# Patient Record
Sex: MALE | Race: OTHER | NOT HISPANIC OR LATINO | URBAN - METROPOLITAN AREA
[De-identification: names, ages, dates, MRNs, and addresses within clinical notes are randomized per-mention and may not be internally consistent; named-entity substitution may affect disease eponyms.]

---

## 2019-07-20 ENCOUNTER — INPATIENT (INPATIENT)
Facility: HOSPITAL | Age: 54
LOS: 7 days | Discharge: HOME | DRG: 919 | End: 2019-07-28
Attending: SURGERY | Admitting: SURGERY
Payer: COMMERCIAL

## 2019-07-20 VITALS
HEART RATE: 91 BPM | HEIGHT: 65 IN | RESPIRATION RATE: 18 BRPM | OXYGEN SATURATION: 100 % | WEIGHT: 130.95 LBS | SYSTOLIC BLOOD PRESSURE: 138 MMHG | DIASTOLIC BLOOD PRESSURE: 93 MMHG | TEMPERATURE: 99 F

## 2019-07-20 DIAGNOSIS — K63.1 PERFORATION OF INTESTINE (NONTRAUMATIC): ICD-10-CM

## 2019-07-20 LAB
ALBUMIN SERPL ELPH-MCNC: 4.1 G/DL — SIGNIFICANT CHANGE UP (ref 3.5–5)
ALP SERPL-CCNC: 80 U/L — SIGNIFICANT CHANGE UP (ref 40–120)
ALT FLD-CCNC: 29 U/L DA — SIGNIFICANT CHANGE UP (ref 10–60)
ANION GAP SERPL CALC-SCNC: 8 MMOL/L — SIGNIFICANT CHANGE UP (ref 5–17)
AST SERPL-CCNC: 18 U/L — SIGNIFICANT CHANGE UP (ref 10–40)
BASOPHILS # BLD AUTO: 0 K/UL — SIGNIFICANT CHANGE UP (ref 0–0.2)
BASOPHILS NFR BLD AUTO: 0 % — SIGNIFICANT CHANGE UP (ref 0–2)
BILIRUB SERPL-MCNC: 4.5 MG/DL — HIGH (ref 0.2–1.2)
BUN SERPL-MCNC: 9 MG/DL — SIGNIFICANT CHANGE UP (ref 7–18)
CALCIUM SERPL-MCNC: 9.5 MG/DL — SIGNIFICANT CHANGE UP (ref 8.4–10.5)
CHLORIDE SERPL-SCNC: 103 MMOL/L — SIGNIFICANT CHANGE UP (ref 96–108)
CO2 SERPL-SCNC: 26 MMOL/L — SIGNIFICANT CHANGE UP (ref 22–31)
CREAT SERPL-MCNC: 1 MG/DL — SIGNIFICANT CHANGE UP (ref 0.5–1.3)
EOSINOPHIL # BLD AUTO: 0 K/UL — SIGNIFICANT CHANGE UP (ref 0–0.5)
EOSINOPHIL NFR BLD AUTO: 0 % — SIGNIFICANT CHANGE UP (ref 0–6)
GLUCOSE SERPL-MCNC: 120 MG/DL — HIGH (ref 70–99)
HCT VFR BLD CALC: 43 % — SIGNIFICANT CHANGE UP (ref 39–50)
HGB BLD-MCNC: 15 G/DL — SIGNIFICANT CHANGE UP (ref 13–17)
INR BLD: 0.98 RATIO — SIGNIFICANT CHANGE UP (ref 0.88–1.16)
LACTATE SERPL-SCNC: 2.7 MMOL/L — HIGH (ref 0.7–2)
LYMPHOCYTES # BLD AUTO: 1.14 K/UL — SIGNIFICANT CHANGE UP (ref 1–3.3)
LYMPHOCYTES # BLD AUTO: 6 % — LOW (ref 13–44)
MCHC RBC-ENTMCNC: 30.7 PG — SIGNIFICANT CHANGE UP (ref 27–34)
MCHC RBC-ENTMCNC: 34.9 GM/DL — SIGNIFICANT CHANGE UP (ref 32–36)
MCV RBC AUTO: 87.9 FL — SIGNIFICANT CHANGE UP (ref 80–100)
MONOCYTES # BLD AUTO: 1.33 K/UL — HIGH (ref 0–0.9)
MONOCYTES NFR BLD AUTO: 7 % — SIGNIFICANT CHANGE UP (ref 2–14)
NEUTROPHILS # BLD AUTO: 16.56 K/UL — HIGH (ref 1.8–7.4)
NEUTROPHILS NFR BLD AUTO: 82 % — HIGH (ref 43–77)
PLATELET # BLD AUTO: 214 K/UL — SIGNIFICANT CHANGE UP (ref 150–400)
POTASSIUM SERPL-MCNC: 3.9 MMOL/L — SIGNIFICANT CHANGE UP (ref 3.5–5.3)
POTASSIUM SERPL-SCNC: 3.9 MMOL/L — SIGNIFICANT CHANGE UP (ref 3.5–5.3)
PROT SERPL-MCNC: 8 G/DL — SIGNIFICANT CHANGE UP (ref 6–8.3)
PROTHROM AB SERPL-ACNC: 10.9 SEC — SIGNIFICANT CHANGE UP (ref 10–12.9)
RBC # BLD: 4.89 M/UL — SIGNIFICANT CHANGE UP (ref 4.2–5.8)
RBC # FLD: 13.4 % — SIGNIFICANT CHANGE UP (ref 10.3–14.5)
SODIUM SERPL-SCNC: 137 MMOL/L — SIGNIFICANT CHANGE UP (ref 135–145)
WBC # BLD: 19.03 K/UL — HIGH (ref 3.8–10.5)
WBC # FLD AUTO: 19.03 K/UL — HIGH (ref 3.8–10.5)

## 2019-07-20 PROCEDURE — 99285 EMERGENCY DEPT VISIT HI MDM: CPT

## 2019-07-20 PROCEDURE — 74177 CT ABD & PELVIS W/CONTRAST: CPT | Mod: 26

## 2019-07-20 RX ORDER — MORPHINE SULFATE 50 MG/1
2 CAPSULE, EXTENDED RELEASE ORAL EVERY 4 HOURS
Refills: 0 | Status: DISCONTINUED | OUTPATIENT
Start: 2019-07-20 | End: 2019-07-20

## 2019-07-20 RX ORDER — ACETAMINOPHEN 500 MG
1000 TABLET ORAL ONCE
Refills: 0 | Status: COMPLETED | OUTPATIENT
Start: 2019-07-20 | End: 2019-07-21

## 2019-07-20 RX ORDER — PIPERACILLIN AND TAZOBACTAM 4; .5 G/20ML; G/20ML
3.38 INJECTION, POWDER, LYOPHILIZED, FOR SOLUTION INTRAVENOUS EVERY 8 HOURS
Refills: 0 | Status: COMPLETED | OUTPATIENT
Start: 2019-07-20 | End: 2019-07-23

## 2019-07-20 RX ORDER — KETOROLAC TROMETHAMINE 30 MG/ML
30 SYRINGE (ML) INJECTION EVERY 6 HOURS
Refills: 0 | Status: DISCONTINUED | OUTPATIENT
Start: 2019-07-20 | End: 2019-07-20

## 2019-07-20 RX ORDER — PIPERACILLIN AND TAZOBACTAM 4; .5 G/20ML; G/20ML
3.38 INJECTION, POWDER, LYOPHILIZED, FOR SOLUTION INTRAVENOUS ONCE
Refills: 0 | Status: COMPLETED | OUTPATIENT
Start: 2019-07-20 | End: 2019-07-20

## 2019-07-20 RX ORDER — SODIUM CHLORIDE 9 MG/ML
1000 INJECTION INTRAMUSCULAR; INTRAVENOUS; SUBCUTANEOUS ONCE
Refills: 0 | Status: COMPLETED | OUTPATIENT
Start: 2019-07-20 | End: 2019-07-20

## 2019-07-20 RX ORDER — DEXTROSE MONOHYDRATE, SODIUM CHLORIDE, AND POTASSIUM CHLORIDE 50; .745; 4.5 G/1000ML; G/1000ML; G/1000ML
1000 INJECTION, SOLUTION INTRAVENOUS
Refills: 0 | Status: DISCONTINUED | OUTPATIENT
Start: 2019-07-20 | End: 2019-07-28

## 2019-07-20 RX ORDER — HEPARIN SODIUM 5000 [USP'U]/ML
5000 INJECTION INTRAVENOUS; SUBCUTANEOUS EVERY 8 HOURS
Refills: 0 | Status: DISCONTINUED | OUTPATIENT
Start: 2019-07-20 | End: 2019-07-28

## 2019-07-20 RX ORDER — SODIUM CHLORIDE 9 MG/ML
1000 INJECTION, SOLUTION INTRAVENOUS ONCE
Refills: 0 | Status: COMPLETED | OUTPATIENT
Start: 2019-07-20 | End: 2019-07-20

## 2019-07-20 RX ORDER — ONDANSETRON 8 MG/1
4 TABLET, FILM COATED ORAL EVERY 6 HOURS
Refills: 0 | Status: DISCONTINUED | OUTPATIENT
Start: 2019-07-20 | End: 2019-07-28

## 2019-07-20 RX ADMIN — PIPERACILLIN AND TAZOBACTAM 200 GRAM(S): 4; .5 INJECTION, POWDER, LYOPHILIZED, FOR SOLUTION INTRAVENOUS at 23:53

## 2019-07-20 RX ADMIN — SODIUM CHLORIDE 1000 MILLILITER(S): 9 INJECTION, SOLUTION INTRAVENOUS at 23:54

## 2019-07-20 RX ADMIN — SODIUM CHLORIDE 1000 MILLILITER(S): 9 INJECTION INTRAMUSCULAR; INTRAVENOUS; SUBCUTANEOUS at 21:30

## 2019-07-20 NOTE — ED PROVIDER NOTE - GASTROINTESTINAL, MLM
Abdomen soft, mid abd-tender, no guarding. mild distention- not peritoneal.  rectal no hemorrhoids, no palpable perforation

## 2019-07-20 NOTE — ED PROVIDER NOTE - OBJECTIVE STATEMENT
53 yr old male with no hx presents to ed c/o mid abd pain and having bloody dark stool post colonoscopy this am.  per Dr Grande had hemorrhoids and diverticulosis and was attempting to evaluate the rectal segment and was difficult.  post scope had bleeding but resolved then went home but then while home having abd pain and rectal pain. no ac use, no n/v, no fever.

## 2019-07-20 NOTE — H&P ADULT - HISTORY OF PRESENT ILLNESS
54 y/o male denies sig PMH/PSH  went for screening colonoscopy earlier this am by Dr Grande  after colonoscopy pt developed generalized abd pain  no f/c/n/v  had dark colored bm today    < from: CT Abdomen and Pelvis w/ IV Cont (07.20.19 @ 22:45) >  FINDINGS:    LUNG BASES:  There are no pleural effusions.  PERITONEUM: No free intraperitoneal air or focal collection.  LIVER: There is a 2.3 cm indeterminate hypodensity in the left lobe which   may be further characterized with MRI, likely cyst or hemangioma.  SPLEEN: Normal.  GALLBLADDER: Unremarkable.  BILIARY TREE: Unremarkable.  PANCREAS: Normal.  ADRENAL GLANDS: Normal.  KIDNEYS: Normal.  BOWEL: Although the stomach appears under distended, there appears to be   wall thickening of the distal stomach suggesting gastritis. There is no   small bowel obstruction. The appendix is unremarkable. The colon is under   distended. There is sigmoid diverticulosis. There is thickening at the   rectosigmoid junction with pericolonic free air and mesenteric venous   gas.     URINARY BLADDER: Decompressed.  PELVIC ORGANS: The prostate is not enlarged. Nonspecific calcifications.    There is no significant adenopathy.  VASCULATURE: Unremarkable.  RETROPERITONEUM: There is moderate amount of retroperitoneal free air,   predominantly around the pelvic sidewall, with extension around the   abdominal aorta, lesser sac, around the IVC, and right perinephric space.  BONES: Unremarkable.  ABDOMINAL WALL: Unremarkable.      IMPRESSION:    Moderate amount of retroperitoneal free air, most likely related to   colonic perforation at the rectosigmoid junction, where there is focal   bowel wall thickening and venous air. No focal collection or bowel   obstruction.    < end of copied text >

## 2019-07-20 NOTE — ED ADULT TRIAGE NOTE - CHIEF COMPLAINT QUOTE
c/o pain to upper and lower abdomen, also rectal pain , 2 hours ago passed black color blood while doing bmm one episode , s/p colonoscopy this morning as per patient

## 2019-07-20 NOTE — ED PROVIDER NOTE - PROGRESS NOTE DETAILS
Edwards: ct shows free air at retroperitoneal.  surgery called.  elevated lactate and wbc.  rpt abd exam, not peritoneal.    admit to surgery for perforated sigmoid. stable

## 2019-07-20 NOTE — H&P ADULT - ASSESSMENT
52 y/o male with abd pain s/p colonoscopy today  CT findings as per HPI  likely rectal perf with retroperitoneal air, no free intraperitoneal air/collection

## 2019-07-20 NOTE — H&P ADULT - PROBLEM SELECTOR PLAN 1
npo, hydrate, iv abx, serial abd exams, labs; discussed w/ pt that surgical intervention will involve colostomy; will treat w/ abx tonight and monitor closely; poss pre-op mode for exlap if no improvement

## 2019-07-20 NOTE — ED PROVIDER NOTE - CLINICAL SUMMARY MEDICAL DECISION MAKING FREE TEXT BOX
53 yr old male with no hx presents to ed c/o mid abd pain and having bloody dark stool post colonoscopy this am.  per Dr Grande had hemorrhoids and diverticulosis and was attempting to evaluate the rectal segment and was difficult.  post scope had bleeding but resolved then went home but then while home having abd pain and rectal pain. no ac use, no n/v, no fever.    pt with abd pain and rectal pain post colonoscopy r/o perforation vs insufflation gas pain - labs, ct, pain meds, re-assess  Dr Grande [phone] wants colorectal surgeon Yaquelin

## 2019-07-20 NOTE — H&P ADULT - NSHPPHYSICALEXAM_GEN_ALL_CORE
Pt awake, alert  NAD  S1S2  abd softly distended lower abd, mild generalized tenderness, no guarding or peritoneal signs  rect def

## 2019-07-21 DIAGNOSIS — K63.1 PERFORATION OF INTESTINE (NONTRAUMATIC): ICD-10-CM

## 2019-07-21 LAB
ANION GAP SERPL CALC-SCNC: 7 MMOL/L — SIGNIFICANT CHANGE UP (ref 5–17)
BUN SERPL-MCNC: 9 MG/DL — SIGNIFICANT CHANGE UP (ref 7–18)
CALCIUM SERPL-MCNC: 8.8 MG/DL — SIGNIFICANT CHANGE UP (ref 8.4–10.5)
CHLORIDE SERPL-SCNC: 103 MMOL/L — SIGNIFICANT CHANGE UP (ref 96–108)
CO2 SERPL-SCNC: 26 MMOL/L — SIGNIFICANT CHANGE UP (ref 22–31)
CREAT SERPL-MCNC: 1.08 MG/DL — SIGNIFICANT CHANGE UP (ref 0.5–1.3)
GLUCOSE SERPL-MCNC: 144 MG/DL — HIGH (ref 70–99)
HCT VFR BLD CALC: 40.7 % — SIGNIFICANT CHANGE UP (ref 39–50)
HGB BLD-MCNC: 14 G/DL — SIGNIFICANT CHANGE UP (ref 13–17)
MCHC RBC-ENTMCNC: 30.2 PG — SIGNIFICANT CHANGE UP (ref 27–34)
MCHC RBC-ENTMCNC: 34.4 GM/DL — SIGNIFICANT CHANGE UP (ref 32–36)
MCV RBC AUTO: 87.7 FL — SIGNIFICANT CHANGE UP (ref 80–100)
NRBC # BLD: 0 /100 WBCS — SIGNIFICANT CHANGE UP (ref 0–0)
PLATELET # BLD AUTO: 192 K/UL — SIGNIFICANT CHANGE UP (ref 150–400)
POTASSIUM SERPL-MCNC: 4.1 MMOL/L — SIGNIFICANT CHANGE UP (ref 3.5–5.3)
POTASSIUM SERPL-SCNC: 4.1 MMOL/L — SIGNIFICANT CHANGE UP (ref 3.5–5.3)
RBC # BLD: 4.64 M/UL — SIGNIFICANT CHANGE UP (ref 4.2–5.8)
RBC # FLD: 13.6 % — SIGNIFICANT CHANGE UP (ref 10.3–14.5)
SODIUM SERPL-SCNC: 136 MMOL/L — SIGNIFICANT CHANGE UP (ref 135–145)
WBC # BLD: 22.3 K/UL — HIGH (ref 3.8–10.5)
WBC # FLD AUTO: 22.3 K/UL — HIGH (ref 3.8–10.5)

## 2019-07-21 PROCEDURE — 99223 1ST HOSP IP/OBS HIGH 75: CPT

## 2019-07-21 RX ORDER — SODIUM CHLORIDE 9 MG/ML
500 INJECTION, SOLUTION INTRAVENOUS ONCE
Refills: 0 | Status: COMPLETED | OUTPATIENT
Start: 2019-07-21 | End: 2019-07-21

## 2019-07-21 RX ORDER — SODIUM CHLORIDE 9 MG/ML
500 INJECTION INTRAMUSCULAR; INTRAVENOUS; SUBCUTANEOUS ONCE
Refills: 0 | Status: COMPLETED | OUTPATIENT
Start: 2019-07-21 | End: 2019-07-21

## 2019-07-21 RX ORDER — SODIUM CHLORIDE 9 MG/ML
1000 INJECTION INTRAMUSCULAR; INTRAVENOUS; SUBCUTANEOUS ONCE
Refills: 0 | Status: COMPLETED | OUTPATIENT
Start: 2019-07-21 | End: 2019-07-21

## 2019-07-21 RX ADMIN — HEPARIN SODIUM 5000 UNIT(S): 5000 INJECTION INTRAVENOUS; SUBCUTANEOUS at 21:47

## 2019-07-21 RX ADMIN — SODIUM CHLORIDE 1000 MILLILITER(S): 9 INJECTION INTRAMUSCULAR; INTRAVENOUS; SUBCUTANEOUS at 01:13

## 2019-07-21 RX ADMIN — DEXTROSE MONOHYDRATE, SODIUM CHLORIDE, AND POTASSIUM CHLORIDE 125 MILLILITER(S): 50; .745; 4.5 INJECTION, SOLUTION INTRAVENOUS at 21:58

## 2019-07-21 RX ADMIN — SODIUM CHLORIDE 1000 MILLILITER(S): 9 INJECTION INTRAMUSCULAR; INTRAVENOUS; SUBCUTANEOUS at 19:47

## 2019-07-21 RX ADMIN — PIPERACILLIN AND TAZOBACTAM 25 GRAM(S): 4; .5 INJECTION, POWDER, LYOPHILIZED, FOR SOLUTION INTRAVENOUS at 21:47

## 2019-07-21 RX ADMIN — HEPARIN SODIUM 5000 UNIT(S): 5000 INJECTION INTRAVENOUS; SUBCUTANEOUS at 13:42

## 2019-07-21 RX ADMIN — PIPERACILLIN AND TAZOBACTAM 25 GRAM(S): 4; .5 INJECTION, POWDER, LYOPHILIZED, FOR SOLUTION INTRAVENOUS at 05:18

## 2019-07-21 RX ADMIN — SODIUM CHLORIDE 1000 MILLILITER(S): 9 INJECTION, SOLUTION INTRAVENOUS at 09:39

## 2019-07-21 RX ADMIN — PIPERACILLIN AND TAZOBACTAM 25 GRAM(S): 4; .5 INJECTION, POWDER, LYOPHILIZED, FOR SOLUTION INTRAVENOUS at 13:40

## 2019-07-21 RX ADMIN — Medication 400 MILLIGRAM(S): at 19:09

## 2019-07-21 RX ADMIN — HEPARIN SODIUM 5000 UNIT(S): 5000 INJECTION INTRAVENOUS; SUBCUTANEOUS at 05:18

## 2019-07-21 RX ADMIN — SODIUM CHLORIDE 500 MILLILITER(S): 9 INJECTION INTRAMUSCULAR; INTRAVENOUS; SUBCUTANEOUS at 21:45

## 2019-07-21 NOTE — CHART NOTE - NSCHARTNOTEFT_GEN_A_CORE
Spoke to ID and Manoj has enough coverage   Dr. Benitez would see the patient in am as he is out of town today

## 2019-07-21 NOTE — CHART NOTE - NSCHARTNOTEFT_GEN_A_CORE
pt seen this am w/Dr Rojas; recommended jimenes cath as pt was urinating multiple times overnight  jimenes placed with clear UO draining  pt feels suprapubic pain relief after jimenes

## 2019-07-21 NOTE — PROGRESS NOTE ADULT - SUBJECTIVE AND OBJECTIVE BOX
Pt seen at bedside this morning. States noticed pain worsened around 2-3am. Has midline abd pain and perirectal pain.      T(F): 98.5 (07-21-19 @ 08:24), Max: 100.3 (07-21-19 @ 06:14)  HR: 102 (07-21-19 @ 08:24) (91 - 114)  BP: 105/62 (07-21-19 @ 08:24) (96/62 - 138/93)  RR: 16 (07-21-19 @ 08:24) (16 - 19)  SpO2: 96% (07-21-19 @ 08:24) (96% - 100%)      PHYSICAL EXAM:  General: NAD, WDWN  Neuro:  Alert & oriented x 3  Lung: respirations nonlabored, good inspiratory effort  Abdomen: soft, tender to midline area, +guarding, no rebound tenderness.   Extremities: no pedal edema or calf tenderness noted     LABS:                        14.0   22.30 )-----------( 192      ( 21 Jul 2019 06:30 )             40.7     07-21    136  |  103  |  9   ----------------------------<  144<H>  4.1   |  26  |  1.08    Ca    8.8      21 Jul 2019 06:30    TPro  8.0  /  Alb  4.1  /  TBili  4.5<H>  /  DBili  x   /  AST  18  /  ALT  29  /  AlkPhos  80  07-20    PT/INR - ( 20 Jul 2019 21:40 )   PT: 10.9 sec;   INR: 0.98 ratio      Imaging:  < from: CT Abdomen and Pelvis w/ IV Cont (07.20.19 @ 22:45) >    IMPRESSION:    Moderate amount of retroperitoneal free air, most likely related to   colonic perforation at the rectosigmoid junction, where there is focal   bowel wall thickening and venous air. No focal collection or bowel   obstruction.The findings were discussed with Dr. Edwards at 11:00 PM on July 20, 2019.  Likely distal gastritis.  Indeterminate hepatic hypodensity may be cyst or hemangioma.  If   clinically indicated, MRI may be performed for further evaluation.      CAROL CAMPOS M.D, ATTENDING RADIOLOGIST  This document has been electronically signed. Jul 20 2019 11:01PM    < end of copied text >

## 2019-07-21 NOTE — PROGRESS NOTE ADULT - ASSESSMENT
53M with abd pain s/p colonoscopy 7/20/19. CT findings c/w rectal perforation with retroperitoneal air, no free intraperitoneal air/collection. WBC uptrending.    - Will observe for now  - NPO/IVF  - Continue IV zosyn  - Serial abd exams  - Trend WBC, lactate  - Celestin catheter    DW Dr. Rojas

## 2019-07-21 NOTE — ED ADULT NURSE NOTE - NSIMPLEMENTINTERV_GEN_ALL_ED
Implemented All Universal Safety Interventions:  [place] to call system. Call bell, personal items and telephone within reach. Instruct patient to call for assistance. Room bathroom lighting operational. Non-slip footwear when patient is off stretcher. Physically safe environment: no spills, clutter or unnecessary equipment. Stretcher in lowest position, wheels locked, appropriate side rails in place.

## 2019-07-22 LAB
ANION GAP SERPL CALC-SCNC: 6 MMOL/L — SIGNIFICANT CHANGE UP (ref 5–17)
BUN SERPL-MCNC: 6 MG/DL — LOW (ref 7–18)
CALCIUM SERPL-MCNC: 8 MG/DL — LOW (ref 8.4–10.5)
CHLORIDE SERPL-SCNC: 110 MMOL/L — HIGH (ref 96–108)
CO2 SERPL-SCNC: 24 MMOL/L — SIGNIFICANT CHANGE UP (ref 22–31)
CREAT SERPL-MCNC: 0.92 MG/DL — SIGNIFICANT CHANGE UP (ref 0.5–1.3)
GLUCOSE SERPL-MCNC: 116 MG/DL — HIGH (ref 70–99)
HCT VFR BLD CALC: 37.4 % — LOW (ref 39–50)
HGB BLD-MCNC: 12.5 G/DL — LOW (ref 13–17)
LACTATE SERPL-SCNC: 0.9 MMOL/L — SIGNIFICANT CHANGE UP (ref 0.7–2)
MCHC RBC-ENTMCNC: 30.4 PG — SIGNIFICANT CHANGE UP (ref 27–34)
MCHC RBC-ENTMCNC: 33.4 GM/DL — SIGNIFICANT CHANGE UP (ref 32–36)
MCV RBC AUTO: 91 FL — SIGNIFICANT CHANGE UP (ref 80–100)
NRBC # BLD: 0 /100 WBCS — SIGNIFICANT CHANGE UP (ref 0–0)
PLATELET # BLD AUTO: 151 K/UL — SIGNIFICANT CHANGE UP (ref 150–400)
POTASSIUM SERPL-MCNC: 3.8 MMOL/L — SIGNIFICANT CHANGE UP (ref 3.5–5.3)
POTASSIUM SERPL-SCNC: 3.8 MMOL/L — SIGNIFICANT CHANGE UP (ref 3.5–5.3)
RBC # BLD: 4.11 M/UL — LOW (ref 4.2–5.8)
RBC # FLD: 13.8 % — SIGNIFICANT CHANGE UP (ref 10.3–14.5)
SODIUM SERPL-SCNC: 140 MMOL/L — SIGNIFICANT CHANGE UP (ref 135–145)
WBC # BLD: 18.58 K/UL — HIGH (ref 3.8–10.5)
WBC # FLD AUTO: 18.58 K/UL — HIGH (ref 3.8–10.5)

## 2019-07-22 PROCEDURE — 99234 HOSP IP/OBS SM DT SF/LOW 45: CPT

## 2019-07-22 PROCEDURE — 72193 CT PELVIS W/DYE: CPT | Mod: 26

## 2019-07-22 RX ORDER — IOHEXOL 300 MG/ML
30 INJECTION, SOLUTION INTRAVENOUS ONCE
Refills: 0 | Status: COMPLETED | OUTPATIENT
Start: 2019-07-22 | End: 2019-07-22

## 2019-07-22 RX ORDER — ACETAMINOPHEN 500 MG
1000 TABLET ORAL ONCE
Refills: 0 | Status: COMPLETED | OUTPATIENT
Start: 2019-07-22 | End: 2019-07-22

## 2019-07-22 RX ORDER — SODIUM CHLORIDE 9 MG/ML
1000 INJECTION INTRAMUSCULAR; INTRAVENOUS; SUBCUTANEOUS ONCE
Refills: 0 | Status: COMPLETED | OUTPATIENT
Start: 2019-07-22 | End: 2019-07-22

## 2019-07-22 RX ADMIN — PIPERACILLIN AND TAZOBACTAM 25 GRAM(S): 4; .5 INJECTION, POWDER, LYOPHILIZED, FOR SOLUTION INTRAVENOUS at 14:02

## 2019-07-22 RX ADMIN — HEPARIN SODIUM 5000 UNIT(S): 5000 INJECTION INTRAVENOUS; SUBCUTANEOUS at 06:02

## 2019-07-22 RX ADMIN — HEPARIN SODIUM 5000 UNIT(S): 5000 INJECTION INTRAVENOUS; SUBCUTANEOUS at 22:44

## 2019-07-22 RX ADMIN — IOHEXOL 30 MILLILITER(S): 300 INJECTION, SOLUTION INTRAVENOUS at 09:05

## 2019-07-22 RX ADMIN — Medication 400 MILLIGRAM(S): at 02:42

## 2019-07-22 RX ADMIN — HEPARIN SODIUM 5000 UNIT(S): 5000 INJECTION INTRAVENOUS; SUBCUTANEOUS at 14:02

## 2019-07-22 RX ADMIN — SODIUM CHLORIDE 1000 MILLILITER(S): 9 INJECTION INTRAMUSCULAR; INTRAVENOUS; SUBCUTANEOUS at 02:41

## 2019-07-22 RX ADMIN — PIPERACILLIN AND TAZOBACTAM 25 GRAM(S): 4; .5 INJECTION, POWDER, LYOPHILIZED, FOR SOLUTION INTRAVENOUS at 06:01

## 2019-07-22 RX ADMIN — PIPERACILLIN AND TAZOBACTAM 25 GRAM(S): 4; .5 INJECTION, POWDER, LYOPHILIZED, FOR SOLUTION INTRAVENOUS at 22:44

## 2019-07-22 RX ADMIN — DEXTROSE MONOHYDRATE, SODIUM CHLORIDE, AND POTASSIUM CHLORIDE 125 MILLILITER(S): 50; .745; 4.5 INJECTION, SOLUTION INTRAVENOUS at 17:43

## 2019-07-22 RX ADMIN — Medication 1000 MILLIGRAM(S): at 02:42

## 2019-07-22 NOTE — CONSULT NOTE ADULT - RS GEN PE MLT RESP DETAILS PC
no wheezes/breath sounds equal/no rhonchi/good air movement/no rales/clear to auscultation bilaterally

## 2019-07-22 NOTE — PROGRESS NOTE ADULT - ASSESSMENT
53M with abd pain s/p colonoscopy 7/20/19. CT findings c/w rectal perforation with retroperitoneal air, no free intraperitoneal air/collection. WBC downtrending, febrile overnight     - Continue NPO/IVF  - Continue IV zosyn  - Serial abd exams  - Trend WBC  - Celestin catheter  -If continues to be febrile despite being on antibiotics may need surgical intervention     TEDDY Rojas 53M with abd pain s/p colonoscopy 7/20/19. CT findings c/w rectal perforation with retroperitoneal air, no free intraperitoneal air/collection. WBC downtrending, febrile overnight     - Continue NPO/IVF  - Continue IV zosyn  - Serial abd exams  - Trend WBC  - Celestin catheter  -If continues to be febrile despite being on antibiotics may need surgical intervention   -CT scan today     TEDDY Rojas

## 2019-07-22 NOTE — PROGRESS NOTE ADULT - SUBJECTIVE AND OBJECTIVE BOX
Pt seen at bedside this morning. States noticed pain is improved today.  Has lower abd pain and perirectal pain though improved compared to yesterday, + flatus no bm. Febrile overnight     Vital Signs Last 24 Hrs  T(C): 37.3 (22 Jul 2019 06:08), Max: 39.2 (21 Jul 2019 19:08)  T(F): 99.2 (22 Jul 2019 06:08), Max: 102.6 (21 Jul 2019 19:08)  HR: 100 (22 Jul 2019 06:08) (100 - 121)  BP: 103/68 (22 Jul 2019 06:08) (93/63 - 114/73)  BP(mean): --  RR: 16 (22 Jul 2019 06:08) (16 - 20)  SpO2: 96% (22 Jul 2019 06:08) (96% - 100%)      PHYSICAL EXAM:  General: NAD, WDWN  Neuro:  Alert & oriented x 3  Lung: respirations nonlabored, good inspiratory effort  Abdomen: soft, tender to midline area, +guarding, no rebound tenderness.   Extremities: no pedal edema or calf tenderness noted     LABS:                                   12.5   18.58 )-----------( 151      ( 22 Jul 2019 06:24 )             37.4   07-22    140  |  110<H>  |  6<L>  ----------------------------<  116<H>  3.8   |  24  |  0.92    Ca    8.0<L>      22 Jul 2019 06:24    TPro  8.0  /  Alb  4.1  /  TBili  4.5<H>  /  DBili  x   /  AST  18  /  ALT  29  /  AlkPhos  80  07-20  I&O's Detail    21 Jul 2019 07:01  -  22 Jul 2019 07:00  --------------------------------------------------------  IN:    dextrose 5% + sodium chloride 0.45% with potassium chloride 20 mEq/L: 375 mL    Sodium Chloride 0.9% IV Bolus: 1000 mL  Total IN: 1375 mL    OUT:    Indwelling Catheter - Urethral: 3000 mL  Total OUT: 3000 mL    Total NET: -1625 mL        Imaging:  < from: CT Abdomen and Pelvis w/ IV Cont (07.20.19 @ 22:45) >    IMPRESSION:    Moderate amount of retroperitoneal free air, most likely related to   colonic perforation at the rectosigmoid junction, where there is focal   bowel wall thickening and venous air. No focal collection or bowel   obstruction.The findings were discussed with Dr. Edwards at 11:00 PM on July 20, 2019.  Likely distal gastritis.  Indeterminate hepatic hypodensity may be cyst or hemangioma.  If   clinically indicated, MRI may be performed for further evaluation.      CAROL CAMPOS M.D, ATTENDING RADIOLOGIST  This document has been electronically signed. Jul 20 2019 11:01PM    < end of copied text >

## 2019-07-22 NOTE — CHART NOTE - NSCHARTNOTEFT_GEN_A_CORE
pt was also seen in pm again  No worsening  Cat scan findings were discussed with the pt  ID consult appreciated and also discussed

## 2019-07-22 NOTE — CONSULT NOTE ADULT - SUBJECTIVE AND OBJECTIVE BOX
HPI:  54 y/o male denies sig PMH/PSH  went for screening colonoscopy earlier this am by Dr Grande  after colonoscopy pt developed generalized abd pain  no f/c/n/v  had dark colored bm today    < from: CT Abdomen and Pelvis w/ IV Cont (07.20.19 @ 22:45) >  FINDINGS:    LUNG BASES:  There are no pleural effusions.  PERITONEUM: No free intraperitoneal air or focal collection.  LIVER: There is a 2.3 cm indeterminate hypodensity in the left lobe which   may be further characterized with MRI, likely cyst or hemangioma.  SPLEEN: Normal.  GALLBLADDER: Unremarkable.  BILIARY TREE: Unremarkable.  PANCREAS: Normal.  ADRENAL GLANDS: Normal.  KIDNEYS: Normal.  BOWEL: Although the stomach appears under distended, there appears to be   wall thickening of the distal stomach suggesting gastritis. There is no   small bowel obstruction. The appendix is unremarkable. The colon is under   distended. There is sigmoid diverticulosis. There is thickening at the   rectosigmoid junction with pericolonic free air and mesenteric venous   gas.     URINARY BLADDER: Decompressed.  PELVIC ORGANS: The prostate is not enlarged. Nonspecific calcifications.    There is no significant adenopathy.  VASCULATURE: Unremarkable.  RETROPERITONEUM: There is moderate amount of retroperitoneal free air,   predominantly around the pelvic sidewall, with extension around the   abdominal aorta, lesser sac, around the IVC, and right perinephric space.  BONES: Unremarkable.  ABDOMINAL WALL: Unremarkable.      IMPRESSION:    Moderate amount of retroperitoneal free air, most likely related to   colonic perforation at the rectosigmoid junction, where there is focal   bowel wall thickening and venous air. No focal collection or bowel   obstruction.    < end of copied text > (20 Jul 2019 23:52)      PAST MEDICAL & SURGICAL HISTORY:      No Known Allergies      Meds:  dextrose 5% + sodium chloride 0.45% with potassium chloride 20 mEq/L 1000 milliLiter(s) IV Continuous <Continuous>  heparin  Injectable 5000 Unit(s) SubCutaneous every 8 hours  ketorolac   Injectable 30 milliGRAM(s) IV Push every 6 hours PRN  morphine  - Injectable 2 milliGRAM(s) IV Push every 4 hours PRN  ondansetron Injectable 4 milliGRAM(s) IV Push every 6 hours PRN  piperacillin/tazobactam IVPB.. 3.375 Gram(s) IV Intermittent every 8 hours      SOCIAL HISTORY:  Smoker:  YES / NO        PACK YEARS:                         WHEN QUIT?  ETOH use:  YES / NO               FREQUENCY / QUANTITY:  Ilicit Drug use:  YES / NO  Occupation:  Assisted device use (Cane / Walker):  Live with:    FAMILY HISTORY:      VITALS:  Vital Signs Last 24 Hrs  T(C): 37.2 (22 Jul 2019 13:00), Max: 39.2 (21 Jul 2019 19:08)  T(F): 98.9 (22 Jul 2019 13:00), Max: 102.6 (21 Jul 2019 19:08)  HR: 103 (22 Jul 2019 13:00) (100 - 121)  BP: 127/86 (22 Jul 2019 13:00) (93/63 - 127/86)  BP(mean): --  RR: 18 (22 Jul 2019 13:00) (16 - 20)  SpO2: 100% (22 Jul 2019 13:00) (96% - 100%)    LABS/DIAGNOSTIC TESTS:                          12.5   18.58 )-----------( 151      ( 22 Jul 2019 06:24 )             37.4     WBC Count: 18.58 K/uL (07-22 @ 06:24)  WBC Count: 22.30 K/uL (07-21 @ 06:30)  WBC Count: 19.03 K/uL (07-20 @ 21:40)      07-22    140  |  110<H>  |  6<L>  ----------------------------<  116<H>  3.8   |  24  |  0.92    Ca    8.0<L>      22 Jul 2019 06:24    TPro  8.0  /  Alb  4.1  /  TBili  4.5<H>  /  DBili  x   /  AST  18  /  ALT  29  /  AlkPhos  80  07-20          LIVER FUNCTIONS - ( 20 Jul 2019 21:40 )  Alb: 4.1 g/dL / Pro: 8.0 g/dL / ALK PHOS: 80 U/L / ALT: 29 U/L DA / AST: 18 U/L / GGT: x             PT/INR - ( 20 Jul 2019 21:40 )   PT: 10.9 sec;   INR: 0.98 ratio             LACTATE:Lactate, Blood: 0.9 mmol/L (07-22 @ 06:23)      ABG -     CULTURES:       RADIOLOGY:< from: CT Abdomen and Pelvis w/ IV Cont (07.20.19 @ 22:45) >  EXAM:  CT ABDOMEN AND PELVIS IC                            PROCEDURE DATE:  07/20/2019          INTERPRETATION:  Abdominal/Pelvic CT    7/20/2019 10:45 PM    Indication: Abdominal pain, status post colonoscopy, evaluate for   perforation    Technique: Axial images were obtained following oral and IV contrast from   the lung bases through pubic symphysis.  90 cc of Omnipaque 350 was   administered intravenously without complication and 10 cc was discarded.    Reformatted coronal and sagittal images are submitted.    Comparison: None    FINDINGS:    LUNG BASES:  There are no pleural effusions.  PERITONEUM: No free intraperitoneal air or focal collection.  LIVER: There is a 2.3 cm indeterminate hypodensity in the left lobe which   may be further characterized with MRI, likely cyst or hemangioma.  SPLEEN: Normal.  GALLBLADDER: Unremarkable.  BILIARY TREE: Unremarkable.  PANCREAS: Normal.  ADRENAL GLANDS: Normal.  KIDNEYS: Normal.  BOWEL: Although the stomach appears under distended, there appears to be   wall thickening of the distal stomach suggesting gastritis. There is no   small bowel obstruction. The appendix is unremarkable. The colon is under   distended. There is sigmoid diverticulosis. There is thickening at the   rectosigmoid junction with pericolonic free air and mesenteric venous   gas.     URINARY BLADDER: Decompressed.  PELVIC ORGANS: The prostate is not enlarged. Nonspecific calcifications.    There is no significant adenopathy.  VASCULATURE: Unremarkable.  RETROPERITONEUM: There is moderate amount of retroperitoneal free air,   predominantly around the pelvic sidewall, with extension around the   abdominal aorta, lesser sac, around the IVC, and right perinephric space.  BONES: Unremarkable.  ABDOMINAL WALL: Unremarkable.    IMPRESSION:    Moderate amount of retroperitoneal free air, most likely related to   colonic perforation at the rectosigmoid junction, where there is focal   bowel wall thickening and venous air. No focal collection or bowel   obstruction.The findings were discussed with Dr. Edwards at 11:00 PM on July 20, 2019.  Likely distal gastritis.  Indeterminate hepatic hypodensity may be cyst or hemangioma.  If   clinically indicated, MRI may be performed for further evaluation.        < end of copied text >        ROS  [  ] UNABLE TO ELICIT HPI:  54 y/o male denies sig PMH/PSH who had a screening colonoscopy but developed lower abdominal pain post procedure and found to have a rectal perforation with leakage of air but no abscess on initial CT here. He c/o pain over his whole abdomen at this time but mostly in the LLQ. He had a second CT scan today to see if he had developed a collection or not. He has diarrhea after getting the CT today from the oral contrast. He has been spiking fevers up to 102.6 and has an elevated WBC count. He is on Zosyn for the last 2 days. His repeat CT shows that he has a persistent leak in the rectum with extravasation of oral contrast but is contained in a small pouch without any evidence of abscess within the abdominal cavity. He has no other complaints except for his abdominal pain and fevers.          PAST MEDICAL & SURGICAL HISTORY: none      No Known Allergies      Meds:  dextrose 5% + sodium chloride 0.45% with potassium chloride 20 mEq/L 1000 milliLiter(s) IV Continuous <Continuous>  heparin  Injectable 5000 Unit(s) SubCutaneous every 8 hours  ketorolac   Injectable 30 milliGRAM(s) IV Push every 6 hours PRN  morphine  - Injectable 2 milliGRAM(s) IV Push every 4 hours PRN  ondansetron Injectable 4 milliGRAM(s) IV Push every 6 hours PRN  piperacillin/tazobactam IVPB.. 3.375 Gram(s) IV Intermittent every 8 hours      SOCIAL HISTORY:  Smoker:   NO          ETOH use:   NO                 Ilicit Drug use:   NO    FAMILY HISTORY:      VITALS:  Vital Signs Last 24 Hrs  T(C): 37.2 (22 Jul 2019 13:00), Max: 39.2 (21 Jul 2019 19:08)  T(F): 98.9 (22 Jul 2019 13:00), Max: 102.6 (21 Jul 2019 19:08)  HR: 103 (22 Jul 2019 13:00) (100 - 121)  BP: 127/86 (22 Jul 2019 13:00) (93/63 - 127/86)  BP(mean): --  RR: 18 (22 Jul 2019 13:00) (16 - 20)  SpO2: 100% (22 Jul 2019 13:00) (96% - 100%)    LABS/DIAGNOSTIC TESTS:                          12.5   18.58 )-----------( 151      ( 22 Jul 2019 06:24 )             37.4     WBC Count: 18.58 K/uL (07-22 @ 06:24)  WBC Count: 22.30 K/uL (07-21 @ 06:30)  WBC Count: 19.03 K/uL (07-20 @ 21:40)      07-22    140  |  110<H>  |  6<L>  ----------------------------<  116<H>  3.8   |  24  |  0.92    Ca    8.0<L>      22 Jul 2019 06:24    TPro  8.0  /  Alb  4.1  /  TBili  4.5<H>  /  DBili  x   /  AST  18  /  ALT  29  /  AlkPhos  80  07-20          LIVER FUNCTIONS - ( 20 Jul 2019 21:40 )  Alb: 4.1 g/dL / Pro: 8.0 g/dL / ALK PHOS: 80 U/L / ALT: 29 U/L DA / AST: 18 U/L / GGT: x             PT/INR - ( 20 Jul 2019 21:40 )   PT: 10.9 sec;   INR: 0.98 ratio             LACTATE:Lactate, Blood: 0.9 mmol/L (07-22 @ 06:23)      ABG -     CULTURES:       RADIOLOGY:< from: CT Abdomen and Pelvis w/ IV Cont (07.20.19 @ 22:45) >  EXAM:  CT ABDOMEN AND PELVIS IC                            PROCEDURE DATE:  07/20/2019          INTERPRETATION:  Abdominal/Pelvic CT    7/20/2019 10:45 PM    Indication: Abdominal pain, status post colonoscopy, evaluate for   perforation    Technique: Axial images were obtained following oral and IV contrast from   the lung bases through pubic symphysis.  90 cc of Omnipaque 350 was   administered intravenously without complication and 10 cc was discarded.    Reformatted coronal and sagittal images are submitted.    Comparison: None    FINDINGS:    LUNG BASES:  There are no pleural effusions.  PERITONEUM: No free intraperitoneal air or focal collection.  LIVER: There is a 2.3 cm indeterminate hypodensity in the left lobe which   may be further characterized with MRI, likely cyst or hemangioma.  SPLEEN: Normal.  GALLBLADDER: Unremarkable.  BILIARY TREE: Unremarkable.  PANCREAS: Normal.  ADRENAL GLANDS: Normal.  KIDNEYS: Normal.  BOWEL: Although the stomach appears under distended, there appears to be   wall thickening of the distal stomach suggesting gastritis. There is no   small bowel obstruction. The appendix is unremarkable. The colon is under   distended. There is sigmoid diverticulosis. There is thickening at the   rectosigmoid junction with pericolonic free air and mesenteric venous   gas.     URINARY BLADDER: Decompressed.  PELVIC ORGANS: The prostate is not enlarged. Nonspecific calcifications.    There is no significant adenopathy.  VASCULATURE: Unremarkable.  RETROPERITONEUM: There is moderate amount of retroperitoneal free air,   predominantly around the pelvic sidewall, with extension around the   abdominal aorta, lesser sac, around the IVC, and right perinephric space.  BONES: Unremarkable.  ABDOMINAL WALL: Unremarkable.    IMPRESSION:    Moderate amount of retroperitoneal free air, most likely related to   colonic perforation at the rectosigmoid junction, where there is focal   bowel wall thickening and venous air. No focal collection or bowel   obstruction.The findings were discussed with Dr. Edwards at 11:00 PM on July 20, 2019.  Likely distal gastritis.  Indeterminate hepatic hypodensity may be cyst or hemangioma.  If   clinically indicated, MRI may be performed for further evaluation.      -----------------------------------------------------------------------------------------------------------------------------------------------    < from: CT Pelvis w/ Oral Cont and w/ IV Cont (07.22.19 @ 12:08) >  EXAM:  CT PELVIS ONLY OC IC                            PROCEDURE DATE:  07/22/2019          INTERPRETATION:  CT of the pelvis with IV and oral contrast    Indication: Rectal perforation. Fever.    Technique: Axial multidetector CT images of the pelvis are acquired   following the administration of oral contrast and IV contrast (90 cc   Omnipaque-350 administered, 10 cc discarded)    Comparison: 7/20/2019.    Findings: Perirectal soft tissue air is again noted with extension to the   retroperitoneum. There is an apparent focal contained oral contrast   extravasation in the left perirectal region (image 30 series 2),   consistent with the clinical impression of rectal perforation. Etiology   of the rectal perforation is uncertain; possibilityof an eroded rectal   cancer cannot be excluded. Clinical correlation is recommended. Sigmoid   diverticulosis without evidence for diverticulitis.    Celestin catheter in the urinary bladder which is collapsed. Small   calcifications are again seen in the prostate.    No pelvic free fluid or enlarged pelvic lymph node. No drainable pelvic   abscess is identified.    Impression: Focal contained oral contrast extravasation in the left   perirectal region, consistent with the clinical impression of rectal   perforation which appears contained. Associated perirectal soft tissue   air with extension to the retroperitoneum.    No drainable pelvic abscess is identified.        < end of copied text >      ROS  [  ] UNABLE TO ELICIT

## 2019-07-22 NOTE — CONSULT NOTE ADULT - ASSESSMENT
Peritonitis - from rectal perforation but no abscess  Fevers  Leukocytosis    plan - cont Zosyn 3.375gms iv q8hrs  will most likely go home on po abxs eventually  keep NPO as he has a persistent leak.

## 2019-07-23 LAB
ANION GAP SERPL CALC-SCNC: 7 MMOL/L — SIGNIFICANT CHANGE UP (ref 5–17)
BUN SERPL-MCNC: 5 MG/DL — LOW (ref 7–18)
CALCIUM SERPL-MCNC: 8.8 MG/DL — SIGNIFICANT CHANGE UP (ref 8.4–10.5)
CHLORIDE SERPL-SCNC: 106 MMOL/L — SIGNIFICANT CHANGE UP (ref 96–108)
CO2 SERPL-SCNC: 25 MMOL/L — SIGNIFICANT CHANGE UP (ref 22–31)
CREAT SERPL-MCNC: 0.95 MG/DL — SIGNIFICANT CHANGE UP (ref 0.5–1.3)
GLUCOSE SERPL-MCNC: 135 MG/DL — HIGH (ref 70–99)
HCT VFR BLD CALC: 38.2 % — LOW (ref 39–50)
HGB BLD-MCNC: 12.9 G/DL — LOW (ref 13–17)
MCHC RBC-ENTMCNC: 30.2 PG — SIGNIFICANT CHANGE UP (ref 27–34)
MCHC RBC-ENTMCNC: 33.8 GM/DL — SIGNIFICANT CHANGE UP (ref 32–36)
MCV RBC AUTO: 89.5 FL — SIGNIFICANT CHANGE UP (ref 80–100)
NRBC # BLD: 0 /100 WBCS — SIGNIFICANT CHANGE UP (ref 0–0)
PLATELET # BLD AUTO: 156 K/UL — SIGNIFICANT CHANGE UP (ref 150–400)
POTASSIUM SERPL-MCNC: 3.5 MMOL/L — SIGNIFICANT CHANGE UP (ref 3.5–5.3)
POTASSIUM SERPL-SCNC: 3.5 MMOL/L — SIGNIFICANT CHANGE UP (ref 3.5–5.3)
RBC # BLD: 4.27 M/UL — SIGNIFICANT CHANGE UP (ref 4.2–5.8)
RBC # FLD: 13.9 % — SIGNIFICANT CHANGE UP (ref 10.3–14.5)
SODIUM SERPL-SCNC: 138 MMOL/L — SIGNIFICANT CHANGE UP (ref 135–145)
WBC # BLD: 13.72 K/UL — HIGH (ref 3.8–10.5)
WBC # FLD AUTO: 13.72 K/UL — HIGH (ref 3.8–10.5)

## 2019-07-23 PROCEDURE — 99233 SBSQ HOSP IP/OBS HIGH 50: CPT

## 2019-07-23 RX ADMIN — DEXTROSE MONOHYDRATE, SODIUM CHLORIDE, AND POTASSIUM CHLORIDE 125 MILLILITER(S): 50; .745; 4.5 INJECTION, SOLUTION INTRAVENOUS at 21:54

## 2019-07-23 RX ADMIN — HEPARIN SODIUM 5000 UNIT(S): 5000 INJECTION INTRAVENOUS; SUBCUTANEOUS at 14:26

## 2019-07-23 RX ADMIN — HEPARIN SODIUM 5000 UNIT(S): 5000 INJECTION INTRAVENOUS; SUBCUTANEOUS at 21:53

## 2019-07-23 RX ADMIN — PIPERACILLIN AND TAZOBACTAM 25 GRAM(S): 4; .5 INJECTION, POWDER, LYOPHILIZED, FOR SOLUTION INTRAVENOUS at 21:53

## 2019-07-23 RX ADMIN — PIPERACILLIN AND TAZOBACTAM 25 GRAM(S): 4; .5 INJECTION, POWDER, LYOPHILIZED, FOR SOLUTION INTRAVENOUS at 14:27

## 2019-07-23 RX ADMIN — DEXTROSE MONOHYDRATE, SODIUM CHLORIDE, AND POTASSIUM CHLORIDE 125 MILLILITER(S): 50; .745; 4.5 INJECTION, SOLUTION INTRAVENOUS at 06:25

## 2019-07-23 RX ADMIN — HEPARIN SODIUM 5000 UNIT(S): 5000 INJECTION INTRAVENOUS; SUBCUTANEOUS at 06:25

## 2019-07-23 RX ADMIN — PIPERACILLIN AND TAZOBACTAM 25 GRAM(S): 4; .5 INJECTION, POWDER, LYOPHILIZED, FOR SOLUTION INTRAVENOUS at 06:25

## 2019-07-23 NOTE — PROGRESS NOTE ADULT - SUBJECTIVE AND OBJECTIVE BOX
Pt seen at bedside this morning. States noticed pain is improved today.  Has lower abd pain and perirectal pain though improved compared to yesterday, + flatus/ bm. Afebrile     Vital Signs Last 24 Hrs  T(C): 37.7 (23 Jul 2019 06:01), Max: 37.7 (23 Jul 2019 06:01)  T(F): 99.8 (23 Jul 2019 06:01), Max: 99.8 (23 Jul 2019 06:01)  HR: 100 (23 Jul 2019 06:01) (100 - 104)  BP: 105/75 (23 Jul 2019 06:01) (105/75 - 127/86)  BP(mean): --  RR: 16 (23 Jul 2019 06:01) (16 - 18)  SpO2: 100% (23 Jul 2019 06:01) (99% - 100%)      PHYSICAL EXAM:  General: NAD, WDWN  Neuro:  Alert & oriented x 3  Lung: respirations nonlabored, good inspiratory effort  Abdomen: soft, tender to midline area, +guarding, no rebound tenderness.   Extremities: no pedal edema or calf tenderness noted     LABS:                                   12.9   13.72 )-----------( 156      ( 23 Jul 2019 05:52 )             38.2   07-23    138  |  106  |  5<L>  ----------------------------<  135<H>  3.5   |  25  |  0.95    Ca    8.8      23 Jul 2019 05:52    I&O's Detail    22 Jul 2019 07:01  -  23 Jul 2019 07:00  --------------------------------------------------------  IN:    dextrose 5% + sodium chloride 0.45% with potassium chloride 20 mEq/L: 1625 mL  Total IN: 1625 mL    OUT:    Indwelling Catheter - Urethral: 2700 mL  Total OUT: 2700 mL    Total NET: -1075 mL      < from: CT Pelvis w/ Oral Cont and w/ IV Cont (07.22.19 @ 12:08) >    EXAM:  CT PELVIS ONLY OC IC                            PROCEDURE DATE:  07/22/2019          INTERPRETATION:  CT of the pelvis with IV and oral contrast    Indication: Rectal perforation. Fever.    Technique: Axial multidetector CT images of the pelvis are acquired   following the administration of oral contrast and IV contrast (90 cc   Omnipaque-350 administered, 10 cc discarded)    Comparison: 7/20/2019.    Findings: Perirectal soft tissue air is again noted with extension to the   retroperitoneum. There is an apparent focal contained oral contrast   extravasation in the left perirectal region (image 30 series 2),   consistent with the clinical impression of rectal perforation. Etiology   of the rectal perforation is uncertain; possibilityof an eroded rectal   cancer cannot be excluded. Clinical correlation is recommended. Sigmoid   diverticulosis without evidence for diverticulitis.    Celestin catheter in the urinary bladder which is collapsed. Small   calcifications are again seen in the prostate.    No pelvic free fluid or enlarged pelvic lymph node. No drainable pelvic   abscess is identified.    Impression: Focal contained oral contrast extravasation in the left   perirectal region, consistent with the clinical impression of rectal   perforation which appears contained. Associated perirectal soft tissue   air with extension to the retroperitoneum.    No drainable pelvic abscess is identified.                MICHAELA YEBOAH M.D., ATTENDING RADIOLOGIST  This document has been electronically signed. Jul 22 2019  3:11PM        < end of copied text >

## 2019-07-23 NOTE — PROGRESS NOTE ADULT - ASSESSMENT
53M with abd pain s/p colonoscopy 7/20/19. CT findings c/w rectal perforation with retroperitoneal air, no free intraperitoneal air/collection. Repeat Ct shows no collection, WBC downtrending, afebrile overnight     - Continue NPO/IVF  - Continue IV zosyn  - Serial abd exams  - Trend WBC  -TOV      DW Dr. Rojas

## 2019-07-23 NOTE — DIETITIAN INITIAL EVALUATION ADULT. - PERTINENT MEDS FT
MEDICATIONS  (STANDING):  dextrose 5% + sodium chloride 0.45% with potassium chloride 20 mEq/L 1000 milliLiter(s) (125 mL/Hr) IV Continuous <Continuous>  heparin  Injectable 5000 Unit(s) SubCutaneous every 8 hours  piperacillin/tazobactam IVPB.. 3.375 Gram(s) IV Intermittent every 8 hours    MEDICATIONS  (PRN):  ketorolac   Injectable 30 milliGRAM(s) IV Push every 6 hours PRN Severe Pain (7 - 10)  morphine  - Injectable 2 milliGRAM(s) IV Push every 4 hours PRN Moderate Pain (4 - 6)  ondansetron Injectable 4 milliGRAM(s) IV Push every 6 hours PRN Nausea and/or Vomiting

## 2019-07-24 LAB
ANION GAP SERPL CALC-SCNC: 6 MMOL/L — SIGNIFICANT CHANGE UP (ref 5–17)
BUN SERPL-MCNC: 5 MG/DL — LOW (ref 7–18)
CALCIUM SERPL-MCNC: 8.5 MG/DL — SIGNIFICANT CHANGE UP (ref 8.4–10.5)
CHLORIDE SERPL-SCNC: 106 MMOL/L — SIGNIFICANT CHANGE UP (ref 96–108)
CO2 SERPL-SCNC: 25 MMOL/L — SIGNIFICANT CHANGE UP (ref 22–31)
CREAT SERPL-MCNC: 0.81 MG/DL — SIGNIFICANT CHANGE UP (ref 0.5–1.3)
GLUCOSE SERPL-MCNC: 128 MG/DL — HIGH (ref 70–99)
HCT VFR BLD CALC: 38 % — LOW (ref 39–50)
HGB BLD-MCNC: 12.8 G/DL — LOW (ref 13–17)
MCHC RBC-ENTMCNC: 29.9 PG — SIGNIFICANT CHANGE UP (ref 27–34)
MCHC RBC-ENTMCNC: 33.7 GM/DL — SIGNIFICANT CHANGE UP (ref 32–36)
MCV RBC AUTO: 88.8 FL — SIGNIFICANT CHANGE UP (ref 80–100)
NRBC # BLD: 0 /100 WBCS — SIGNIFICANT CHANGE UP (ref 0–0)
PLATELET # BLD AUTO: 207 K/UL — SIGNIFICANT CHANGE UP (ref 150–400)
POTASSIUM SERPL-MCNC: 3.6 MMOL/L — SIGNIFICANT CHANGE UP (ref 3.5–5.3)
POTASSIUM SERPL-SCNC: 3.6 MMOL/L — SIGNIFICANT CHANGE UP (ref 3.5–5.3)
RBC # BLD: 4.28 M/UL — SIGNIFICANT CHANGE UP (ref 4.2–5.8)
RBC # FLD: 13.8 % — SIGNIFICANT CHANGE UP (ref 10.3–14.5)
SODIUM SERPL-SCNC: 137 MMOL/L — SIGNIFICANT CHANGE UP (ref 135–145)
WBC # BLD: 8.34 K/UL — SIGNIFICANT CHANGE UP (ref 3.8–10.5)
WBC # FLD AUTO: 8.34 K/UL — SIGNIFICANT CHANGE UP (ref 3.8–10.5)

## 2019-07-24 PROCEDURE — 99232 SBSQ HOSP IP/OBS MODERATE 35: CPT

## 2019-07-24 RX ORDER — PIPERACILLIN AND TAZOBACTAM 4; .5 G/20ML; G/20ML
3.38 INJECTION, POWDER, LYOPHILIZED, FOR SOLUTION INTRAVENOUS EVERY 8 HOURS
Refills: 0 | Status: DISCONTINUED | OUTPATIENT
Start: 2019-07-24 | End: 2019-07-28

## 2019-07-24 RX ADMIN — DEXTROSE MONOHYDRATE, SODIUM CHLORIDE, AND POTASSIUM CHLORIDE 125 MILLILITER(S): 50; .745; 4.5 INJECTION, SOLUTION INTRAVENOUS at 14:17

## 2019-07-24 RX ADMIN — HEPARIN SODIUM 5000 UNIT(S): 5000 INJECTION INTRAVENOUS; SUBCUTANEOUS at 21:33

## 2019-07-24 RX ADMIN — PIPERACILLIN AND TAZOBACTAM 25 GRAM(S): 4; .5 INJECTION, POWDER, LYOPHILIZED, FOR SOLUTION INTRAVENOUS at 14:18

## 2019-07-24 RX ADMIN — DEXTROSE MONOHYDRATE, SODIUM CHLORIDE, AND POTASSIUM CHLORIDE 125 MILLILITER(S): 50; .745; 4.5 INJECTION, SOLUTION INTRAVENOUS at 05:26

## 2019-07-24 RX ADMIN — HEPARIN SODIUM 5000 UNIT(S): 5000 INJECTION INTRAVENOUS; SUBCUTANEOUS at 05:26

## 2019-07-24 RX ADMIN — HEPARIN SODIUM 5000 UNIT(S): 5000 INJECTION INTRAVENOUS; SUBCUTANEOUS at 14:18

## 2019-07-24 RX ADMIN — PIPERACILLIN AND TAZOBACTAM 25 GRAM(S): 4; .5 INJECTION, POWDER, LYOPHILIZED, FOR SOLUTION INTRAVENOUS at 21:33

## 2019-07-24 NOTE — PROGRESS NOTE ADULT - SUBJECTIVE AND OBJECTIVE BOX
Pt seen at bedside this morning. States pain is improved today.  Has some lower abd pain though improved compared to yesterday, + flatus/ bm. Afebrile     Vital Signs Last 24 Hrs  T(C): 37.1 (24 Jul 2019 05:12), Max: 37.1 (23 Jul 2019 21:35)  T(F): 98.8 (24 Jul 2019 05:12), Max: 98.8 (24 Jul 2019 05:12)  HR: 92 (24 Jul 2019 05:12) (89 - 92)  BP: 113/74 (24 Jul 2019 05:12) (105/69 - 119/89)  BP(mean): --  RR: 16 (24 Jul 2019 05:12) (16 - 18)  SpO2: 99% (24 Jul 2019 05:12) (99% - 100%)      PHYSICAL EXAM:  General: NAD, WDWN  Neuro:  Alert & oriented x 3  Lung: respirations nonlabored, good inspiratory effort  Abdomen: soft, tender to midline area, +guarding, no rebound tenderness.   Extremities: no pedal edema or calf tenderness noted     LABS:                                   12.8   8.34  )-----------( 207      ( 24 Jul 2019 07:20 )             38.0   07-24    137  |  106  |  5<L>  ----------------------------<  128<H>  3.6   |  25  |  0.81    Ca    8.5      24 Jul 2019 07:20

## 2019-07-24 NOTE — PROGRESS NOTE ADULT - SUBJECTIVE AND OBJECTIVE BOX
53y Male    Meds:    Allergies    No Known Allergies    Intolerances        VITALS:  Vital Signs Last 24 Hrs  T(C): 37.1 (24 Jul 2019 05:12), Max: 37.1 (23 Jul 2019 21:35)  T(F): 98.8 (24 Jul 2019 05:12), Max: 98.8 (24 Jul 2019 05:12)  HR: 92 (24 Jul 2019 05:12) (89 - 92)  BP: 113/74 (24 Jul 2019 05:12) (105/69 - 113/74)  BP(mean): --  RR: 16 (24 Jul 2019 05:12) (16 - 16)  SpO2: 99% (24 Jul 2019 05:12) (99% - 100%)    LABS/DIAGNOSTIC TESTS:                          12.8   8.34  )-----------( 207      ( 24 Jul 2019 07:20 )             38.0         07-24    137  |  106  |  5<L>  ----------------------------<  128<H>  3.6   |  25  |  0.81    Ca    8.5      24 Jul 2019 07:20            CULTURES:       RADIOLOGY:      ROS:  [  ] UNABLE TO ELICIT 53y Male who is doing much better today, he has a lot less abdominal pain, he has no fevers or chills, he was started on clears and is tolerating it so far. He denies any Nausea, vomiting or diarrhea.    Meds:    Allergies    No Known Allergies    Intolerances        VITALS:  Vital Signs Last 24 Hrs  T(C): 37.1 (24 Jul 2019 05:12), Max: 37.1 (23 Jul 2019 21:35)  T(F): 98.8 (24 Jul 2019 05:12), Max: 98.8 (24 Jul 2019 05:12)  HR: 92 (24 Jul 2019 05:12) (89 - 92)  BP: 113/74 (24 Jul 2019 05:12) (105/69 - 113/74)  BP(mean): --  RR: 16 (24 Jul 2019 05:12) (16 - 16)  SpO2: 99% (24 Jul 2019 05:12) (99% - 100%)    LABS/DIAGNOSTIC TESTS:                          12.8   8.34  )-----------( 207      ( 24 Jul 2019 07:20 )             38.0         07-24    137  |  106  |  5<L>  ----------------------------<  128<H>  3.6   |  25  |  0.81    Ca    8.5      24 Jul 2019 07:20            CULTURES:       RADIOLOGY:      ROS:  [  ] UNABLE TO ELICIT

## 2019-07-24 NOTE — PROGRESS NOTE ADULT - ASSESSMENT
Peritonitis - from rectal perforation but no abscess  Fevers - resolved  Leukocytosis - normalized    plan - cont Zosyn 3.375gms iv q8hrs  will most likely go home on po abxs eventually

## 2019-07-24 NOTE — PROGRESS NOTE ADULT - ASSESSMENT
53M with abd pain s/p colonoscopy 7/20/19. CT findings c/w rectal perforation with retroperitoneal air, no free intraperitoneal air/collection. Repeat Ct shows no collection, WBC downtrending, afebrile overnight     - Clear liquid diet   - Continue IV zosyn  - Trend WBC  - prn pain control       TEDDY Rojas

## 2019-07-25 LAB
ANION GAP SERPL CALC-SCNC: 5 MMOL/L — SIGNIFICANT CHANGE UP (ref 5–17)
BUN SERPL-MCNC: 2 MG/DL — LOW (ref 7–18)
CALCIUM SERPL-MCNC: 9 MG/DL — SIGNIFICANT CHANGE UP (ref 8.4–10.5)
CHLORIDE SERPL-SCNC: 105 MMOL/L — SIGNIFICANT CHANGE UP (ref 96–108)
CO2 SERPL-SCNC: 27 MMOL/L — SIGNIFICANT CHANGE UP (ref 22–31)
CREAT SERPL-MCNC: 0.76 MG/DL — SIGNIFICANT CHANGE UP (ref 0.5–1.3)
GLUCOSE SERPL-MCNC: 125 MG/DL — HIGH (ref 70–99)
HCT VFR BLD CALC: 38.1 % — LOW (ref 39–50)
HGB BLD-MCNC: 12.6 G/DL — LOW (ref 13–17)
MCHC RBC-ENTMCNC: 30 PG — SIGNIFICANT CHANGE UP (ref 27–34)
MCHC RBC-ENTMCNC: 33.1 GM/DL — SIGNIFICANT CHANGE UP (ref 32–36)
MCV RBC AUTO: 90.7 FL — SIGNIFICANT CHANGE UP (ref 80–100)
NRBC # BLD: 0 /100 WBCS — SIGNIFICANT CHANGE UP (ref 0–0)
PLATELET # BLD AUTO: 243 K/UL — SIGNIFICANT CHANGE UP (ref 150–400)
POTASSIUM SERPL-MCNC: 4.1 MMOL/L — SIGNIFICANT CHANGE UP (ref 3.5–5.3)
POTASSIUM SERPL-SCNC: 4.1 MMOL/L — SIGNIFICANT CHANGE UP (ref 3.5–5.3)
RBC # BLD: 4.2 M/UL — SIGNIFICANT CHANGE UP (ref 4.2–5.8)
RBC # FLD: 13.8 % — SIGNIFICANT CHANGE UP (ref 10.3–14.5)
SODIUM SERPL-SCNC: 137 MMOL/L — SIGNIFICANT CHANGE UP (ref 135–145)
WBC # BLD: 6.74 K/UL — SIGNIFICANT CHANGE UP (ref 3.8–10.5)
WBC # FLD AUTO: 6.74 K/UL — SIGNIFICANT CHANGE UP (ref 3.8–10.5)

## 2019-07-25 PROCEDURE — 99232 SBSQ HOSP IP/OBS MODERATE 35: CPT

## 2019-07-25 RX ADMIN — PIPERACILLIN AND TAZOBACTAM 25 GRAM(S): 4; .5 INJECTION, POWDER, LYOPHILIZED, FOR SOLUTION INTRAVENOUS at 05:40

## 2019-07-25 RX ADMIN — PIPERACILLIN AND TAZOBACTAM 25 GRAM(S): 4; .5 INJECTION, POWDER, LYOPHILIZED, FOR SOLUTION INTRAVENOUS at 13:23

## 2019-07-25 RX ADMIN — HEPARIN SODIUM 5000 UNIT(S): 5000 INJECTION INTRAVENOUS; SUBCUTANEOUS at 22:05

## 2019-07-25 RX ADMIN — PIPERACILLIN AND TAZOBACTAM 25 GRAM(S): 4; .5 INJECTION, POWDER, LYOPHILIZED, FOR SOLUTION INTRAVENOUS at 22:05

## 2019-07-25 RX ADMIN — HEPARIN SODIUM 5000 UNIT(S): 5000 INJECTION INTRAVENOUS; SUBCUTANEOUS at 13:23

## 2019-07-25 RX ADMIN — HEPARIN SODIUM 5000 UNIT(S): 5000 INJECTION INTRAVENOUS; SUBCUTANEOUS at 05:40

## 2019-07-25 NOTE — PROGRESS NOTE ADULT - ASSESSMENT
53 year old male with rectal perforation    - continue clear liquid diet  - continue to monitor labs  - possible advance diet in AM  - continue IV antibiotics  - discussed with Dr. Rojas

## 2019-07-25 NOTE — PROGRESS NOTE ADULT - SUBJECTIVE AND OBJECTIVE BOX
Patient seen and examined at bedside with no complaints.   Admits to lower abdominal pain, improving.   +BM/melena. No pain with defecation     Vital Signs Last 24 Hrs  T(F): 98.3 (07-25-19 @ 04:50), Max: 98.6 (07-24-19 @ 14:21)  HR: 79 (07-25-19 @ 04:50)  BP: 101/70 (07-25-19 @ 04:50)  RR: 17 (07-25-19 @ 04:50)  SpO2: 100% (07-25-19 @ 04:50)    GENERAL: Alert, NAD  CHEST/LUNG:  respirations nonlabored  ABDOMEN: soft, lower abdominal tenderness to palpation, mild distention     I&O's Detail    LABS:                        12.6   6.74  )-----------( 243      ( 25 Jul 2019 07:48 )             38.1     07-25    137  |  105  |  2<L>  ----------------------------<  125<H>  4.1   |  27  |  0.76    Ca    9.0      25 Jul 2019 07:48

## 2019-07-26 PROCEDURE — 99231 SBSQ HOSP IP/OBS SF/LOW 25: CPT

## 2019-07-26 RX ADMIN — PIPERACILLIN AND TAZOBACTAM 25 GRAM(S): 4; .5 INJECTION, POWDER, LYOPHILIZED, FOR SOLUTION INTRAVENOUS at 13:39

## 2019-07-26 RX ADMIN — HEPARIN SODIUM 5000 UNIT(S): 5000 INJECTION INTRAVENOUS; SUBCUTANEOUS at 06:18

## 2019-07-26 RX ADMIN — PIPERACILLIN AND TAZOBACTAM 25 GRAM(S): 4; .5 INJECTION, POWDER, LYOPHILIZED, FOR SOLUTION INTRAVENOUS at 22:27

## 2019-07-26 RX ADMIN — HEPARIN SODIUM 5000 UNIT(S): 5000 INJECTION INTRAVENOUS; SUBCUTANEOUS at 22:26

## 2019-07-26 RX ADMIN — PIPERACILLIN AND TAZOBACTAM 25 GRAM(S): 4; .5 INJECTION, POWDER, LYOPHILIZED, FOR SOLUTION INTRAVENOUS at 06:18

## 2019-07-26 RX ADMIN — HEPARIN SODIUM 5000 UNIT(S): 5000 INJECTION INTRAVENOUS; SUBCUTANEOUS at 13:40

## 2019-07-26 NOTE — DISCHARGE NOTE PROVIDER - NSFOLLOWUPCLINICS_GEN_ALL_ED_FT
[facility]  [address] [place] [address]  Phone: [phone]  Fax: [phone]  Follow Up Time:

## 2019-07-26 NOTE — DISCHARGE NOTE PROVIDER - HOSPITAL COURSE
52 y/o male denies sig PMH/PSH went for screening colonoscopy earlier this am by Dr Grande after colonoscopy pt developed generalized abd pain. No f/c/n/v    had dark colored bm today 54 y/o male denies sig PMH/PSH went for screening colonoscopy earlier this am by Dr Grande after colonoscopy pt developed generalized abd pain. No f/c/n/v    had dark colored bm today. On CT patient was found to have retroperitoneal air likely representing rectal perforation. Patient was admitted for observation, was kept npo with IV fluids. Repeat CT scan revealed no collection of worsening of free air. His symptoms improved and diet was advanced slowly as tolerated. He was discharged home once reg diet was tolerated and had bowel movement without difficulty.

## 2019-07-26 NOTE — PROGRESS NOTE ADULT - SUBJECTIVE AND OBJECTIVE BOX
Patient seen and examined at bedside, reports mild lower abdominal pain but improved  +BM/melena. No pain with defecation   tolerating clears    Vital Signs Last 24 Hrs  T(C): 36.9 (26 Jul 2019 06:00), Max: 37 (25 Jul 2019 21:38)  T(F): 98.4 (26 Jul 2019 06:00), Max: 98.6 (25 Jul 2019 21:38)  HR: 87 (26 Jul 2019 06:00) (81 - 89)  BP: 108/71 (26 Jul 2019 06:00) (106/69 - 108/71)  BP(mean): --  RR: 16 (26 Jul 2019 06:00) (16 - 18)  SpO2: 99% (26 Jul 2019 06:00) (99% - 100%)    GENERAL: Alert, NAD  CHEST/LUNG:  respirations nonlabored  ABDOMEN: soft, lower abdominal tenderness to palpation, nondistended

## 2019-07-26 NOTE — DISCHARGE NOTE PROVIDER - CARE PROVIDER_API CALL
Desmond Rojas (MD)  Surgery  [address] [place], [place] [id number]  Phone: [phone]  Fax: [phone]  Follow Up Time:

## 2019-07-26 NOTE — PROGRESS NOTE ADULT - ASSESSMENT
53 year old male with rectal perforation    - continue clear liquid diet  - possible advance diet this afternoon  - oob and ambulate

## 2019-07-26 NOTE — DISCHARGE NOTE PROVIDER - NSDCCPCAREPLAN_GEN_ALL_CORE_FT
PRINCIPAL DISCHARGE DIAGNOSIS  Diagnosis: Perforated sigmoid colon  Assessment and Plan of Treatment: s/p colonoscopy . Patient was admitted for observation & iv fluids

## 2019-07-27 RX ADMIN — PIPERACILLIN AND TAZOBACTAM 25 GRAM(S): 4; .5 INJECTION, POWDER, LYOPHILIZED, FOR SOLUTION INTRAVENOUS at 06:38

## 2019-07-27 RX ADMIN — HEPARIN SODIUM 5000 UNIT(S): 5000 INJECTION INTRAVENOUS; SUBCUTANEOUS at 06:38

## 2019-07-27 RX ADMIN — HEPARIN SODIUM 5000 UNIT(S): 5000 INJECTION INTRAVENOUS; SUBCUTANEOUS at 14:44

## 2019-07-27 RX ADMIN — HEPARIN SODIUM 5000 UNIT(S): 5000 INJECTION INTRAVENOUS; SUBCUTANEOUS at 22:06

## 2019-07-27 RX ADMIN — PIPERACILLIN AND TAZOBACTAM 25 GRAM(S): 4; .5 INJECTION, POWDER, LYOPHILIZED, FOR SOLUTION INTRAVENOUS at 22:06

## 2019-07-27 RX ADMIN — PIPERACILLIN AND TAZOBACTAM 25 GRAM(S): 4; .5 INJECTION, POWDER, LYOPHILIZED, FOR SOLUTION INTRAVENOUS at 14:44

## 2019-07-27 NOTE — PROGRESS NOTE ADULT - SUBJECTIVE AND OBJECTIVE BOX
Patient seen and examined at bedside, reports mild lower abdominal pain to deep palpation but improved  +BM non bloody. No pain with defecation   tolerating soft diet    Vital Signs Last 24 Hrs  T(C): 36.8 (27 Jul 2019 06:29), Max: 36.9 (26 Jul 2019 14:48)  T(F): 98.3 (27 Jul 2019 06:29), Max: 98.5 (26 Jul 2019 14:48)  HR: 79 (27 Jul 2019 06:29) (77 - 81)  BP: 104/72 (27 Jul 2019 06:29) (104/72 - 109/69)  BP(mean): --  RR: 16 (27 Jul 2019 06:29) (16 - 17)  SpO2: 100% (27 Jul 2019 06:29) (100% - 100%)    GENERAL: Alert, NAD  CHEST/LUNG:  respirations nonlabored  ABDOMEN: soft, Left lower abdominal tenderness to deep palpation, nondistended

## 2019-07-27 NOTE — PROGRESS NOTE ADULT - ASSESSMENT
53 year old male with rectal perforation    - continue soft diet  - possible d/c planning for 7/28  - oob and ambulate encouraged

## 2019-07-28 VITALS
RESPIRATION RATE: 17 BRPM | TEMPERATURE: 98 F | DIASTOLIC BLOOD PRESSURE: 67 MMHG | SYSTOLIC BLOOD PRESSURE: 106 MMHG | HEART RATE: 92 BPM | OXYGEN SATURATION: 99 %

## 2019-07-28 RX ORDER — METRONIDAZOLE 500 MG
1 TABLET ORAL
Qty: 42 | Refills: 0
Start: 2019-07-28

## 2019-07-28 RX ORDER — CIPROFLOXACIN LACTATE 400MG/40ML
1 VIAL (ML) INTRAVENOUS
Qty: 28 | Refills: 0
Start: 2019-07-28

## 2019-07-28 RX ADMIN — HEPARIN SODIUM 5000 UNIT(S): 5000 INJECTION INTRAVENOUS; SUBCUTANEOUS at 05:34

## 2019-07-28 RX ADMIN — PIPERACILLIN AND TAZOBACTAM 25 GRAM(S): 4; .5 INJECTION, POWDER, LYOPHILIZED, FOR SOLUTION INTRAVENOUS at 05:34

## 2019-07-28 NOTE — DISCHARGE NOTE NURSING/CASE MANAGEMENT/SOCIAL WORK - NSDCDPATPORTLINK_GEN_ALL_CORE
You can access the [facility] Portal, offered by [facility], by registering with the following website: [email or web address]

## 2019-07-28 NOTE — PROGRESS NOTE ADULT - SUBJECTIVE AND OBJECTIVE BOX
53y Male who is doing well, he has no abdominal pain, has been tolerating solid food and had a reg BM. He has no fevers or chills. He is scheduled to go home today.    Meds:  piperacillin/tazobactam IVPB.. 3.375 Gram(s) IV Intermittent every 8 hours    Allergies    No Known Allergies    Intolerances        VITALS:  Vital Signs Last 24 Hrs  T(C): 36.8 (28 Jul 2019 13:17), Max: 36.8 (27 Jul 2019 21:15)  T(F): 98.3 (28 Jul 2019 13:17), Max: 98.3 (28 Jul 2019 13:17)  HR: 92 (28 Jul 2019 13:17) (77 - 92)  BP: 106/67 (28 Jul 2019 13:17) (103/77 - 113/70)  BP(mean): --  RR: 17 (28 Jul 2019 13:17) (16 - 17)  SpO2: 99% (28 Jul 2019 13:17) (99% - 100%)    LABS/DIAGNOSTIC TESTS:                      CULTURES:       RADIOLOGY:      ROS:  [  ] UNABLE TO ELICIT

## 2019-07-28 NOTE — PROGRESS NOTE ADULT - ASSESSMENT
53 year old male with rectal perforation    - advance to reg diet  - possible d/c planning for 7/28d/c planning today  - oob and ambulate encouraged

## 2019-07-28 NOTE — PROGRESS NOTE ADULT - GASTROINTESTINAL DETAILS
no distention/nontender/bowel sounds normal/no rebound tenderness/no rigidity/no guarding/no organomegaly/soft
no distention/no masses palpable/bowel sounds normal/soft/no rebound tenderness/no guarding/no rigidity

## 2019-07-28 NOTE — PROGRESS NOTE ADULT - ATTENDING COMMENTS
Mr. Herzog overall feels well, minimal pain, passing flatus and has had bowel movement. Afebrile tolerating a soft diet. Will continue IV abx until 7/28 and will discharge.
Mr. Herzog overall feels well. No current complaints. He is tolerating a diet. Has had formed stool and passing flatus. He denies abdominal pain. Cleared for discharge from surgical perspective today.
pt examined several times today  Abdomen soft with very minimal lower tenderness  He had voided small amounts about 8 times with pressure in the suprapubic area  Celestin was placed with relief of the pressure sensation  Had also spoken to the GI who did the colonoscopy at 1 AM  He did not find any pathology un the screening colonoscopy  Cat scan reviewed with the radiologist  No free fluid in the area of perforation  Had a long d/w the pt  All the options were discussed with the pt.  The potential for colostomy was clearly discussed  The potential to get septic was also discussed  He feels better than yesterday. Less abdominal pain on no pain med  Increased wbc count is also noted  All the options, benefits and risks of each modality of treatment were discussed  He understands all the risk   Since he is feeling better, he als wants conservative rx now  Will closely monitor him
pt feeling much better  Less lower abdominal pain  No fever. WBC 13 k  abdomen soft  NPO  IV abx  Cat scan findings discussed wit the pt
pt feeling much better  abdomen soft  normal wbc with no fever  sips of clears
pt seen in am  abdomen soft  No tenesmus  on reg diet today
pt seen in am  abdomen soft  passing flatus  on clears and tolerating  will advance to reg diet in am
Pt seen in am  Feeling better  Has lower abdominal tenderness on deep palpation  Spiked during the day yesterday. No fever now  WBC better  Will repeat pelvic ct  Dr. Benitez will see him today / ID  Discussed yesterday with his GI physician about his progress

## 2019-07-28 NOTE — PROGRESS NOTE ADULT - PROVIDER SPECIALTY LIST ADULT
Infectious Disease
Surgery
Infectious Disease

## 2019-07-28 NOTE — PROGRESS NOTE ADULT - REASON FOR ADMISSION
rectal perforation

## 2019-07-28 NOTE — PROGRESS NOTE ADULT - ASSESSMENT
Peritonitis - from rectal perforation but no abscess  Fevers - resolved  Leukocytosis - normalized    plan - cont Zosyn 3.375gms iv q8hrs  can dc home today on Ceftin 500mgs po bid and flagyl 500mgs po tid both for 8 days  reconsult prn.

## 2019-07-28 NOTE — PROGRESS NOTE ADULT - SUBJECTIVE AND OBJECTIVE BOX
Patient seen and examined at bedside, denies abdominal pain today  +BM non bloody. No pain with defecation   tolerating soft diet    Vital Signs Last 24 Hrs  T(C): 36.7 (28 Jul 2019 05:11), Max: 36.9 (27 Jul 2019 12:53)  T(F): 98 (28 Jul 2019 05:11), Max: 98.4 (27 Jul 2019 12:53)  HR: 77 (28 Jul 2019 05:11) (77 - 97)  BP: 103/77 (28 Jul 2019 05:11) (103/77 - 120/73)  BP(mean): --  RR: 17 (28 Jul 2019 05:11) (16 - 17)  SpO2: 99% (28 Jul 2019 05:11) (99% - 100%)      GENERAL: Alert, NAD  CHEST/LUNG:  respirations nonlabored  ABDOMEN: soft, nontender, nondistended   Extemities: no edema marta, no calf tenderness marta

## 2019-07-29 PROCEDURE — 99285 EMERGENCY DEPT VISIT HI MDM: CPT | Mod: 25

## 2019-07-29 PROCEDURE — 85610 PROTHROMBIN TIME: CPT

## 2019-07-29 PROCEDURE — 74177 CT ABD & PELVIS W/CONTRAST: CPT

## 2019-07-29 PROCEDURE — 86850 RBC ANTIBODY SCREEN: CPT

## 2019-07-29 PROCEDURE — 80048 BASIC METABOLIC PNL TOTAL CA: CPT

## 2019-07-29 PROCEDURE — 36415 COLL VENOUS BLD VENIPUNCTURE: CPT

## 2019-07-29 PROCEDURE — 86901 BLOOD TYPING SEROLOGIC RH(D): CPT

## 2019-07-29 PROCEDURE — 72193 CT PELVIS W/DYE: CPT

## 2019-07-29 PROCEDURE — 86900 BLOOD TYPING SEROLOGIC ABO: CPT

## 2019-07-29 PROCEDURE — 83605 ASSAY OF LACTIC ACID: CPT

## 2019-07-29 PROCEDURE — 85027 COMPLETE CBC AUTOMATED: CPT

## 2019-07-29 PROCEDURE — 80053 COMPREHEN METABOLIC PANEL: CPT
